# Patient Record
Sex: MALE | Race: WHITE
[De-identification: names, ages, dates, MRNs, and addresses within clinical notes are randomized per-mention and may not be internally consistent; named-entity substitution may affect disease eponyms.]

---

## 2017-06-01 ENCOUNTER — HOSPITAL ENCOUNTER (EMERGENCY)
Dept: HOSPITAL 74 - JER | Age: 22
Discharge: HOME | End: 2017-06-01
Payer: SELF-PAY

## 2017-06-01 VITALS — SYSTOLIC BLOOD PRESSURE: 154 MMHG | DIASTOLIC BLOOD PRESSURE: 86 MMHG | TEMPERATURE: 98.2 F | HEART RATE: 60 BPM

## 2017-06-01 VITALS — BODY MASS INDEX: 31.6 KG/M2

## 2017-06-01 DIAGNOSIS — R07.89: Primary | ICD-10-CM

## 2017-06-01 DIAGNOSIS — R06.4: ICD-10-CM

## 2017-06-01 LAB
ALBUMIN SERPL-MCNC: 4.1 G/DL (ref 3.4–5)
ALP SERPL-CCNC: 71 U/L (ref 45–117)
ALT SERPL-CCNC: 39 U/L (ref 12–78)
ANION GAP SERPL CALC-SCNC: 9 MMOL/L (ref 8–16)
AST SERPL-CCNC: 18 U/L (ref 15–37)
BASOPHILS # BLD: 0.7 % (ref 0–2)
BILIRUB SERPL-MCNC: 0.6 MG/DL (ref 0.2–1)
CALCIUM SERPL-MCNC: 9.1 MG/DL (ref 8.5–10.1)
CO2 SERPL-SCNC: 24 MMOL/L (ref 21–32)
COCKROFT - GAULT: 146.93
CREAT SERPL-MCNC: 1 MG/DL (ref 0.7–1.3)
DEPRECATED RDW RBC AUTO: 12.9 % (ref 11.9–15.9)
EOSINOPHIL # BLD: 0.6 % (ref 0–4.5)
GLUCOSE SERPL-MCNC: 85 MG/DL (ref 74–106)
INR BLD: 1.12 (ref 0.82–1.09)
MCH RBC QN AUTO: 29.5 PG (ref 25.7–33.7)
MCHC RBC AUTO-ENTMCNC: 33.9 G/DL (ref 32–35.9)
MCV RBC: 86.9 FL (ref 80–96)
NEUTROPHILS # BLD: 65.7 % (ref 42.8–82.8)
PLATELET # BLD AUTO: 154 K/MM3 (ref 134–434)
PMV BLD: 10.8 FL (ref 7.5–11.1)
PROT SERPL-MCNC: 7.1 G/DL (ref 6.4–8.2)
PT PNL PPP: 12.3 SEC (ref 9.98–11.88)
TROPONIN I SERPL-MCNC: < 0.02 NG/ML (ref 0–0.05)
WBC # BLD AUTO: 8.2 K/MM3 (ref 4–10)

## 2017-06-01 PROCEDURE — 3E0333Z INTRODUCTION OF ANTI-INFLAMMATORY INTO PERIPHERAL VEIN, PERCUTANEOUS APPROACH: ICD-10-PCS

## 2017-06-01 NOTE — PDOC
History of Present Illness





<Nghia Lewis - Last Filed: 06/01/17 22:42>





- General


History Source: Patient


Exam Limitations: No Limitations





<Esperanza Ramos - Last Filed: 06/01/17 22:46>





- General


Chief Complaint: Chest Pain


Stated Complaint: CHEST PAIN


Time Seen by Provider: 06/01/17 20:21





- History of Present Illness


Initial Comments: 


06/01/17 20:43


Patient is a 21 year old male with no significant past medical history who 

presents to the ED with chest tightness. Patient states that he has been 

experiencing 4 days of intermittent sharp chest tightness, nonradiating, 

nonpleuritic with 5/10 in severity that has worsened today. Patient states that 

the chest tightness is worse at night. He reports 1 episode of vomiting today, 

non bloody. 


He denies any chest pain. 


Allergies: NKA





 (Esperanza Ramos)








Past History





- Immunization History


Immunization Up to Date: Yes





- Psycho/Social/Smoking Cessation Hx


Anxiety: No


Suicidal Ideation: No


Smoking Status: No


Smoking History: Current every day smoker


Number of Cigarettes Smoked Daily: 9


Information on smoking cessation initiated: Yes


Hx Alcohol Use: No


Drug/Substance Use Hx: No


Substance Use Type: None





<Nghia Lewis - Last Filed: 06/01/17 22:42>





<Esperanza Ramos - Last Filed: 06/01/17 22:46>





- Past Medical History


Allergies/Adverse Reactions: 


 Allergies











Allergy/AdvReac Type Severity Reaction Status Date / Time


 


No Known Allergies Allergy   Verified 06/01/17 20:12











Home Medications: 


Ambulatory Orders





NK [No Known Home Medication]  06/01/17 











**Review of Systems





<Nghia Lewis - Last Filed: 06/01/17 22:42>





- Review of Systems


Able to Perform ROS?: Yes





<Esperanza Ramos - Last Filed: 06/01/17 22:46>





- Review of Systems


Comments:: 





06/01/17 20:44


CONSTITUTIONAL: No fever, no chills, no fatigue


EYES: No visual changes


ENT: No ear pain, no sore throat


CARDIOVASCULAR: (+)chest tightness. No chest pain, no palpitations


RESPIRATORY: No cough, no SOB


GI: No abdominal pain, no nausea, no vomiting, no constipation, no diarrhea


GENITOURINARY: No dysuria, no frequency, no hematuria


MUSCULOSKELETAL: No back pain, no joint pain, no myalgias


SKIN: No rash


NEURO: No headache


 (Esperanza Ramos)








*Physical Exam





<Nghia Lewis - Last Filed: 06/01/17 22:42>





<Esperanza Ramos - Last Filed: 06/01/17 22:46>





- Vital Signs


 Last Vital Signs











Temp Pulse Resp BP Pulse Ox


 


    61   21   139/75   100 


 


    06/01/17 21:31  06/01/17 21:31  06/01/17 21:31  06/01/17 21:31

















- Physical Exam


Comments: 


06/01/17 20:44


CONSTITUTIONAL: (+)Anxious appearing, (+)Tachypneic.


HEAD: Normocephalic; atraumatic


EYES: PERRL; EOM intact


ENMT: External appears normal; normal oropharynx


NECK: Supple; non-tender; no cervical lymphadenopathy


CARD: (+)Reproducible left chest wall tenderness. Normal S1, S2; no murmurs, 

rubs, or gallops


RESP: Normal chest excursion with respiration; breath sounds clear and equal 

bilaterally; no wheezes, rhonchi, or rales


ABD: Soft, non-distended; non-tender; no palpable organomegaly, no palpable 

hernias


EXT: Normal ROM in all four extremities; non-tender to palpation; distal pulses 

intact


SKIN: Warm, dry, no rash


NEURO: No focal neurological deficiencies.





 (Esperanza Ramos)








**Heart Score/ECG Review





<Nghia Lewis - Last Filed: 06/01/17 22:42>





<Esperanza Ramos - Last Filed: 06/01/17 22:46>


  ** #1





06/01/17 20:45


NS with sinus arrhythmia


Normal ECG


Vent rate 82 bpm (Esperanza Ramos)








ED Treatment Course





- LABORATORY


CBC & Chemistry Diagram: 


 06/01/17 20:54





 06/01/17 20:54





<Nghia Lewis - Last Filed: 06/01/17 22:42>





- LABORATORY


CBC & Chemistry Diagram: 


 06/01/17 20:54





 06/01/17 20:54





<Esperanza Ramos - Last Filed: 06/01/17 22:46>





- ADDITIONAL ORDERS


Additional order review: 


 Laboratory  Results











  06/01/17 06/01/17 06/01/17





  20:54 20:54 20:54


 


INR    1.12


 


Sodium   142 


 


Potassium   3.6 


 


Chloride   109 H 


 


Carbon Dioxide   24 


 


Anion Gap   9 


 


BUN   17 


 


Creatinine   1.0 


 


Creat Clearance w eGFR   > 60 


 


Random Glucose   85 


 


Calcium   9.1 


 


Total Bilirubin   0.6  D 


 


AST   18  D 


 


ALT   39 


 


Alkaline Phosphatase   71 


 


Creatine Kinase   363 H 


 


Creatine Kinase Index   0.5 


 


CK-MB (CK-2)   1.701 


 


CK-MB (CK-2) Rel Index  Cancelled  


 


Troponin I   < 0.02 


 


Total Protein   7.1 


 


Albumin   4.1 








 











  06/01/17





  20:54


 


RBC  5.29


 


MCV  86.9


 


MCHC  33.9


 


RDW  12.9


 


MPV  10.8


 


Neutrophils %  65.7


 


Lymphocytes %  27.6  D


 


Monocytes %  5.4


 


Eosinophils %  0.6


 


Basophils %  0.7

















- Medications


Given in the ED: 


ED Medications














Discontinued Medications














Generic Name Dose Route Start Last Admin





  Trade Name Freq  PRN Reason Stop Dose Admin


 


Ketorolac Tromethamine  30 mg 06/01/17 20:36 06/01/17 20:53





  Toradol Injection -  IVPUSH 06/01/17 20:37  30 mg





  ONCE ONE   Administration

















Medical Decision Making





<Nghia Lewis - Last Filed: 06/01/17 22:42>





<Esperanza Ramos - Last Filed: 06/01/17 22:46>





- Medical Decision Making





06/01/17 22:42


Patient is a 21-year-old male who presented to the ER with atraumatic 

reproducible left anterior chest wall pain. On initial evaluation, patient was 

noted to be hyperventilating, anxious appearing. Physical exam revealed 

reproducible left anterior chest wall tenderness to palpation. After period of 

reassurance, patient's vital signs normalized without intervention. Chest x-ray 

reveals no evidence of cardiomegaly or pneumothorax. CBC/CMP within normal 

limit. Cardiac enzymes reveal minimally elevated CPK but a normal troponin. I 

do not suspect ACS or PE at this time. Musculoskeletal chest pain is likely. 

Patient has received Toradol and reports significant improvement levels of his 

pain.  Will discharge with medical follow-up as needed. (Nghia Lewis)








*DC/Admit/Observation/Transfer





<Nghia Lewis - Last Filed: 06/01/17 22:42>





<Esperanza Ramos - Last Filed: 06/01/17 22:46>


Diagnosis at time of Disposition: 


 Atypical chest pain





- Discharge Dispostion


Disposition: HOME





- Referrals


Referrals: 


[facility] [Provider Group]





- Patient Instructions


Printed Discharge Instructions:  DI for Atypical Chest Pain





- Attestations


Physician Attestion: 





06/01/17 22:42


The documentation was prepared by the scribe under my direct supervision. I 

have reviewed the documentation which correctly represents the findings, 

medical decision-making and critical action taken by me. (Nghia Lewis)

## 2018-04-23 ENCOUNTER — HOSPITAL ENCOUNTER (EMERGENCY)
Dept: HOSPITAL 74 - JERFT | Age: 23
Discharge: HOME | End: 2018-04-23
Payer: COMMERCIAL

## 2018-04-23 VITALS — DIASTOLIC BLOOD PRESSURE: 66 MMHG | TEMPERATURE: 98.1 F | SYSTOLIC BLOOD PRESSURE: 131 MMHG | HEART RATE: 88 BPM

## 2018-04-23 VITALS — BODY MASS INDEX: 31.9 KG/M2
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
## 2018-04-23 DIAGNOSIS — S63.8X1A: Primary | ICD-10-CM
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
## 2018-04-23 NOTE — PDOC
History of Present Illness





- General


Chief Complaint: Injury


Stated Complaint: SWOLLEN RT HAND


Time Seen by Provider: 04/23/18 13:43


History Source: Patient





- History of Present Illness


Occurred: reports: yesterday, other


Upper Extremity Pain Location: right: hand





Past History





- Past Medical History


Allergies/Adverse Reactions: 


 Allergies











Allergy/AdvReac Type Severity Reaction Status Date / Time


 


No Known Allergies Allergy   Verified 04/23/18 13:39











Home Medications: 


Ambulatory Orders





NK [No Known Home Medication]  06/01/17 








COPD: No





- Immunization History


Immunization Up to Date: Yes





- Suicide/Smoking/Psychosocial Hx


Smoking Status: No


Smoking History: Current every day smoker


Have you smoked in the past 12 months: No


Number of Cigarettes Smoked Daily: 9


Information on smoking cessation initiated: Yes


Hx Alcohol Use: No


Drug/Substance Use Hx: No


Substance Use Type: None





**Review of Systems





- Review of Systems


Musculoskeletal: Yes: Joint Pain, Joint Swelling





*Physical Exam





- Vital Signs


 Last Vital Signs











Temp Pulse Resp BP Pulse Ox


 


 98.1 F   88   18   131/66   100 


 


 04/23/18 13:39  04/23/18 13:39  04/23/18 13:39  04/23/18 13:39  04/23/18 13:39














- Physical Exam


HEENT: positive: Normal Voice


Extremity: positive: Swelling (significant swelling w/ some ecchymosis to 

thenar eminnence of R hand, FROMi to R thumb, no snuffbox ttp)





ED Treatment Course





- RADIOLOGY


Radiology Studies Ordered: 














 Category Date Time Status


 


 FINGER(S) RIGHT [RAD] Stat Radiology  04/23/18 13:51 Ordered


 


 HAND- RIGHT [RAD] Stat Radiology  04/23/18 13:51 Ordered














- Medications


Given in the ED: 


ED Medications














Discontinued Medications














Generic Name Dose Route Start Last Admin





  Trade Name Freq  PRN Reason Stop Dose Admin


 


Ibuprofen  800 mg  04/23/18 13:52  04/23/18 13:57





  Motrin -  PO  04/23/18 13:53  800 mg





  ONCE ONE   Administration














Medical Decision Making





- Medical Decision Making





04/23/18 14:05


22-year-old male, no significant history here with right hand pain, swelling.  

Patient states during a game of baseball yesterday he felt immediate pain and 

noticed swelling right after swinging the bat.  States symptoms worsened this 

a.m.  Denies any fall





See exam





Possible R hand sprain, r/o fx


-xr


-pain meds





04/23/18 14:53


Xray neg for fracture. ACE placed to R hand. To take motrin prn pain. Hand f/u 

as needed in 2 weeks











*DC/Admit/Observation/Transfer


Diagnosis at time of Disposition: 


Hand sprain


Qualifiers:


 Encounter type: initial encounter Laterality: right Qualified Code(s): 

S63.91XA - Sprain of unspecified part of right wrist and hand, initial encounter








- Discharge Dispostion


Disposition: HOME


Condition at time of disposition: Improved





- Referrals


Referrals: 


Nixon Garcia MD [Staff Physician] - 





- Patient Instructions


Additional Instructions: 


Your xray was negative for any fracture.  You most likely sustain a hand sprain 

which can take 1-2 weeks to heal.  Keep Ace bandage in place for swelling.  You 

can also ice area and elevate at home to reduce swelling.  Take 800 of Motrin 

every 6 hour period.


If symptoms persist after 2 weeks.  Follow-up with Dr. Garcia of orthopedics





- Post Discharge Activity


Forms/Work/School Notes:  Back to School

## 2018-11-27 ENCOUNTER — HOSPITAL ENCOUNTER (EMERGENCY)
Dept: HOSPITAL 74 - JER | Age: 23
LOS: 1 days | Discharge: HOME | End: 2018-11-28
Payer: COMMERCIAL

## 2018-11-27 VITALS — TEMPERATURE: 98.3 F

## 2018-11-27 VITALS — BODY MASS INDEX: 34.9 KG/M2

## 2018-11-27 DIAGNOSIS — K29.70: Primary | ICD-10-CM

## 2018-11-27 LAB
ALBUMIN SERPL-MCNC: 4.1 G/DL (ref 3.4–5)
ALP SERPL-CCNC: 73 U/L (ref 45–117)
ALT SERPL-CCNC: 41 U/L (ref 13–61)
ANION GAP SERPL CALC-SCNC: 7 MMOL/L (ref 8–16)
AST SERPL-CCNC: 22 U/L (ref 15–37)
BASOPHILS # BLD: 0.3 % (ref 0–2)
BILIRUB SERPL-MCNC: 0.6 MG/DL (ref 0.2–1)
BUN SERPL-MCNC: 20 MG/DL (ref 7–18)
CALCIUM SERPL-MCNC: 8.8 MG/DL (ref 8.5–10.1)
CHLORIDE SERPL-SCNC: 103 MMOL/L (ref 98–107)
CO2 SERPL-SCNC: 25 MMOL/L (ref 21–32)
CREAT SERPL-MCNC: 1.1 MG/DL (ref 0.55–1.3)
DEPRECATED RDW RBC AUTO: 12.6 % (ref 11.9–15.9)
EOSINOPHIL # BLD: 0.5 % (ref 0–4.5)
GLUCOSE SERPL-MCNC: 99 MG/DL (ref 74–106)
HCT VFR BLD CALC: 43.8 % (ref 35.4–49)
HGB BLD-MCNC: 15.7 GM/DL (ref 11.7–16.9)
INR BLD: 1 (ref 0.83–1.09)
LYMPHOCYTES # BLD: 12.7 % (ref 8–40)
MAGNESIUM SERPL-MCNC: 2 MG/DL (ref 1.8–2.4)
MCH RBC QN AUTO: 30.5 PG (ref 25.7–33.7)
MCHC RBC AUTO-ENTMCNC: 35.8 G/DL (ref 32–35.9)
MCV RBC: 85.1 FL (ref 80–96)
MONOCYTES # BLD AUTO: 2.8 % (ref 3.8–10.2)
NEUTROPHILS # BLD: 83.7 % (ref 42.8–82.8)
PLATELET # BLD AUTO: 186 K/MM3 (ref 134–434)
PMV BLD: 10.4 FL (ref 7.5–11.1)
POTASSIUM SERPLBLD-SCNC: 4.6 MMOL/L (ref 3.5–5.1)
PROT SERPL-MCNC: 7.6 G/DL (ref 6.4–8.2)
PT PNL PPP: 11.8 SEC (ref 9.7–13)
RBC # BLD AUTO: 5.15 M/MM3 (ref 4–5.6)
SODIUM SERPL-SCNC: 136 MMOL/L (ref 136–145)
WBC # BLD AUTO: 11.9 K/MM3 (ref 4–10)

## 2018-11-27 PROCEDURE — 3E033GC INTRODUCTION OF OTHER THERAPEUTIC SUBSTANCE INTO PERIPHERAL VEIN, PERCUTANEOUS APPROACH: ICD-10-PCS

## 2018-11-27 NOTE — PDOC
History of Present Illness





- General


Chief Complaint: Chest Pain


Stated Complaint: ABNORMAL EKG


Time Seen by Provider: 11/27/18 21:00


History Source: Patient





- History of Present Illness


Initial Comments: 





11/27/18 23:19


23 year old male with epigastric pain raditing up to right chest pain for the 

last 6 days. reports that yesterday he was woken up out of sleep with chest 

pain. reports that pain is sometimes burning in nature. patient was sent here 

by [facility] for evaluation of chest pain. patient reports that chest pain 

is worse with deep breath. denies NVD, fever/ chills, urinary symptoms or flank 

pain. 





Past History





- Past Medical History


Allergies/Adverse Reactions: 


 Allergies











Allergy/AdvReac Type Severity Reaction Status Date / Time


 


No Known Allergies Allergy   Verified 11/27/18 20:54











Home Medications: 


Ambulatory Orders





Famotidine [Pepcid -] 20 mg PO BID #14 tablet 11/28/18 








COPD: No





- Immunization History


Immunization Up to Date: Yes





- Suicide/Smoking/Psychosocial Hx


Smoking Status: No


Smoking History: Never smoked


Have you smoked in the past 12 months: No


Number of Cigarettes Smoked Daily: 9


Information on smoking cessation initiated: No


Hx Alcohol Use: No


Drug/Substance Use Hx: No


Substance Use Type: None





*Physical Exam





- Vital Signs


 Last Vital Signs











Temp Pulse Resp BP Pulse Ox


 


 98.3 F   75   16   137/65   100 


 


 11/27/18 20:53  11/27/18 20:53  11/27/18 20:53  11/27/18 20:53  11/27/18 20:53














- Physical Exam


General Appearance: Yes: Appropriately Dressed


Respiratory/Chest: positive: Lungs Clear, Normal Breath Sounds.  negative: 

Chest Tender


Cardiovascular: positive: Regular Rhythm, Regular Rate


Gastrointestinal/Abdominal: positive: Normal Bowel Sounds, Soft.  negative: 

Tender (no epigastric RUQ tenderness)


Musculoskeletal: positive: Normal Inspection


Extremity: positive: Normal Capillary Refill, Normal Inspection, Normal Range 

of Motion


Integumentary: positive: Normal Color, Dry, Warm


Neurologic: positive: Fully Oriented, Alert





Moderate Sedation





- Procedure Monitoring


Vital Signs: 


Procedure Monitoring Vital Signs











Temperature  98.3 F   11/27/18 20:53


 


Pulse Rate  75   11/27/18 20:53


 


Respiratory Rate  16   11/27/18 20:53


 


Blood Pressure  137/65   11/27/18 20:53


 


O2 Sat by Pulse Oximetry (%)  100   11/27/18 20:53











ED Treatment Course





- LABORATORY


CBC & Chemistry Diagram: 


 11/27/18 22:41





 11/27/18 22:41





- ADDITIONAL ORDERS


Additional order review: 


 Laboratory  Results











  11/27/18 11/27/18 11/27/18





  22:41 22:41 22:41


 


PT with INR    11.80


 


INR    1.00


 


D-Dimer   < 215 


 


Sodium  136  


 


Potassium  4.6  


 


Chloride  103  


 


Carbon Dioxide  25  


 


Anion Gap  7 L  


 


BUN  20 H  


 


Creatinine  1.1  


 


Creat Clearance w eGFR  > 60  


 


Random Glucose  99  


 


Calcium  8.8  


 


Magnesium  2.0  


 


Total Bilirubin  0.6  


 


AST  22  


 


ALT  41  


 


Alkaline Phosphatase  73  


 


Creatine Kinase  149  


 


Troponin I  < 0.02  


 


Total Protein  7.6  


 


Albumin  4.1  








 











  11/27/18





  22:41


 


RBC  5.15


 


MCV  85.1


 


MCHC  35.8


 


RDW  12.6


 


MPV  10.4


 


Neutrophils %  83.7 H D


 


Lymphocytes %  12.7  D


 


Monocytes %  2.8 L


 


Eosinophils %  0.5


 


Basophils %  0.3














- RADIOLOGY


Radiology Studies Ordered: 














 Category Date Time Status


 


 CHEST PA & LAT [RAD] Stat Radiology  11/27/18 22:19 Taken














- Medications


Given in the ED: 


ED Medications














Discontinued Medications














Generic Name Dose Route Start Last Admin





  Trade Name Freq  PRN Reason Stop Dose Admin


 


Al Hydroxide/Mg Hydroxide  30 ml  11/27/18 22:36  11/27/18 22:52





  Mylanta Oral Suspension -  PO  11/27/18 22:37  30 ml





  ONCE ONE   Administration





     





     





     





     


 


Famotidine/Sodium Chloride  20 mg in 50 mls @ 100 mls/hr  11/27/18 22:36  11/27/ 18 22:52





  Pepcid 20 Mg Premixed Ivpb -  IVPB  11/27/18 23:05  100 mls/hr





  ONCE ONE   Administration





     





     





     





     














Medical Decision Making





- Medical Decision Making





11/27/18 23:23


A: chest pain/ gastritis





*DC/Admit/Observation/Transfer


Diagnosis at time of Disposition: 


Chest pain


Qualifiers:


 Chest pain type: unspecified Qualified Code(s): R07.9 - Chest pain, unspecified





Gastritis


Qualifiers:


 Gastritis type: unspecified gastritis Chronicity: unspecified Gastritis 

bleeding: without bleeding Qualified Code(s): K29.70 - Gastritis, unspecified, 

without bleeding








- Discharge Dispostion


Disposition: HOME





- Prescriptions


Prescriptions: 


Famotidine [Pepcid -] 20 mg PO BID #14 tablet





- Referrals





- Patient Instructions


Printed Discharge Instructions:  [facility] for Chest Pain, [place] [facility]


Additional Instructions: 


start a bland diet. 





take pepcid as prescribed





follow up with your doctor as soon as possible. 








return to the ER if symptoms worsen. 





- Post Discharge Activity

## 2018-11-27 NOTE — PDOC
Rapid Medical Evaluation


Chief Complaint: Chest Pain


Time Seen by Provider: 11/27/18 21:00


Medical Evaluation: 


 Allergies











Allergy/AdvReac Type Severity Reaction Status Date / Time


 


No Known Allergies Allergy   Verified 11/27/18 20:54








Vital Signs











Temp Pulse Resp BP Pulse Ox


 


 98.3 F   75   16   137/65   100 


 


 11/27/18 20:53  11/27/18 20:53  11/27/18 20:53  11/27/18 20:53  11/27/18 20:53








11/27/18 21:01


Patient c/o: went to urgent care for chest abd pain, had normal labs , ekg with 

lateral st elevation, smokes marijuana


Patient on brief exam: no reproducible chest pain, ekg no st elevation in lat 

leads rate 76


Patient ordered for: ekg


The patient will proceed to the ED





**Discharge Disposition





- Diagnosis


 Chest pain








- Referrals





- Patient Instructions





- Post Discharge Activity

## 2018-11-28 ENCOUNTER — HOSPITAL ENCOUNTER (EMERGENCY)
Dept: HOSPITAL 74 - JER | Age: 23
LOS: 1 days | Discharge: HOME | End: 2018-11-29
Payer: COMMERCIAL

## 2018-11-28 VITALS — HEART RATE: 88 BPM | TEMPERATURE: 98.6 F | DIASTOLIC BLOOD PRESSURE: 93 MMHG | SYSTOLIC BLOOD PRESSURE: 180 MMHG

## 2018-11-28 VITALS — DIASTOLIC BLOOD PRESSURE: 74 MMHG | SYSTOLIC BLOOD PRESSURE: 124 MMHG | HEART RATE: 74 BPM

## 2018-11-28 VITALS — BODY MASS INDEX: 34.2 KG/M2

## 2018-11-28 DIAGNOSIS — R07.9: Primary | ICD-10-CM

## 2018-11-28 LAB
ALBUMIN SERPL-MCNC: 4.7 G/DL (ref 3.4–5)
ALP SERPL-CCNC: 76 U/L (ref 45–117)
ALT SERPL-CCNC: 42 U/L (ref 13–61)
ANION GAP SERPL CALC-SCNC: 12 MMOL/L (ref 8–16)
APPEARANCE UR: (no result)
AST SERPL-CCNC: 18 U/L (ref 15–37)
BASOPHILS # BLD: 0.4 % (ref 0–2)
BILIRUB SERPL-MCNC: 1 MG/DL (ref 0.2–1)
BILIRUB UR STRIP.AUTO-MCNC: NEGATIVE MG/DL
BUN SERPL-MCNC: 14 MG/DL (ref 7–18)
CALCIUM SERPL-MCNC: 9.5 MG/DL (ref 8.5–10.1)
CHLORIDE SERPL-SCNC: 102 MMOL/L (ref 98–107)
CO2 SERPL-SCNC: 23 MMOL/L (ref 21–32)
COLOR UR: (no result)
CREAT SERPL-MCNC: 1 MG/DL (ref 0.55–1.3)
DEPRECATED RDW RBC AUTO: 12.5 % (ref 11.9–15.9)
EOSINOPHIL # BLD: 0.2 % (ref 0–4.5)
GLUCOSE SERPL-MCNC: 81 MG/DL (ref 74–106)
HCT VFR BLD CALC: 46.7 % (ref 35.4–49)
HGB BLD-MCNC: 16.7 GM/DL (ref 11.7–16.9)
KETONES UR QL STRIP: NEGATIVE
LEUKOCYTE ESTERASE UR QL STRIP.AUTO: NEGATIVE
LYMPHOCYTES # BLD: 17 % (ref 8–40)
MAGNESIUM SERPL-MCNC: 2.2 MG/DL (ref 1.8–2.4)
MCH RBC QN AUTO: 30.5 PG (ref 25.7–33.7)
MCHC RBC AUTO-ENTMCNC: 35.8 G/DL (ref 32–35.9)
MCV RBC: 85.1 FL (ref 80–96)
MONOCYTES # BLD AUTO: 4.5 % (ref 3.8–10.2)
MUCOUS THREADS URNS QL MICRO: (no result)
NEUTROPHILS # BLD: 77.9 % (ref 42.8–82.8)
NITRITE UR QL STRIP: NEGATIVE
PH UR: 5 [PH] (ref 5–8)
PLATELET # BLD AUTO: 182 K/MM3 (ref 134–434)
PMV BLD: 10.1 FL (ref 7.5–11.1)
POTASSIUM SERPLBLD-SCNC: 4.1 MMOL/L (ref 3.5–5.1)
PROT SERPL-MCNC: 8.2 G/DL (ref 6.4–8.2)
PROT UR QL STRIP: (no result)
PROT UR QL STRIP: (no result)
RBC # BLD AUTO: 5.48 M/MM3 (ref 4–5.6)
SODIUM SERPL-SCNC: 137 MMOL/L (ref 136–145)
SP GR UR: 1.02 (ref 1.01–1.03)
UROBILINOGEN UR STRIP-MCNC: NEGATIVE MG/DL (ref 0.2–1)
WBC # BLD AUTO: 8.9 K/MM3 (ref 4–10)

## 2018-11-28 NOTE — PDOC
History of Present Illness





- General


Chief Complaint: Palpitations


Stated Complaint: CHEST PAIN


Time Seen by Provider: 11/28/18 19:05





- History of Present Illness


Initial Comments: 





11/28/18 20:32


24 yo M with no significant pmh who p/w epigastirc pain. Patient reports onset 

of stable, pressure-type, epigastria and right sided chest pain beginning 11/22/ 18, with no identifiable triggers. Pain unremitting, and worse with supine 

position. Denies food triggers. Pain slightly improved with Ranitidine PO. 

Normal bowel habits. No change in appetite. Patient also with diaphoresis, and 

nervousness, which he attributes to Perocet cessation x 1 week. Patient with 

chronic opioid dependence. Percocet x 5+ years for dental carry related pain. 

Patient to be seen by sponsor next week. 





Patient denies N/V, PND, palpitations, F,C, CP, SOB, urinary complaints, BPR, 

hematuria, diarrhea, constipation, lightheadedness, weakness, sensory changes. 





PMHx: as noted above. Denies h/o ACS/MI, endoscopy, or GI f/u. Denies h/o 

abdominal surgery.  


ROS: as noted


SHx: tobacco use 1/4 ppd x 5 years. Social Etoh. Denies IVDA. 


Allergies:NKDA











Past History





- Past Medical History


Allergies/Adverse Reactions: 


 Allergies











Allergy/AdvReac Type Severity Reaction Status Date / Time


 


No Known Allergies Allergy   Verified 11/28/18 19:08











Home Medications: 


Ambulatory Orders





Famotidine [Pepcid -] 20 mg PO BID #14 tablet 11/28/18 


Pantoprazole Sodium [Protonix -] 40 mg PO DAILY #30 tablet.ec 11/28/18 








COPD: No





- Immunization History


Immunization Up to Date: Yes





- Suicide/Smoking/Psychosocial Hx


Smoking Status: No


Smoking History: Never smoked


Have you smoked in the past 12 months: No


Number of Cigarettes Smoked Daily: 9


Hx Alcohol Use: No


Drug/Substance Use Hx: No


Substance Use Type: None





**Review of Systems





- Review of Systems


Comments:: 





11/28/18 20:33


GENERAL/CONSTITUTIONAL: No fever or chills. No weakness.


HEAD, EYES, EARS, NOSE AND THROAT: No change in vision. No ear pain or 

discharge. No sore throat.


CARDIOVASCULAR: No chest pain or shortness of breath


RESPIRATORY: No cough, wheezing, or hemoptysis.


GASTROINTESTINAL: + Epigastric abdominal pain. No nausea, vomiting, diarrhea or 

constipation.


GENITOURINARY: No dysuria, frequency, or change in urination.


MUSCULOSKELETAL: No joint or muscle swelling or pain. No neck or back pain.


SKIN: No rash


NEUROLOGIC: No headache, vertigo, loss of consciousness, or change in strength/

sensation.


ENDOCRINE: No increased thirst. No abnormal weight change


HEMATOLOGIC/LYMPHATIC: No anemia, easy bleeding, or history of blood clots.


ALLERGIC/IMMUNOLOGIC: No hives or skin allergy.











*Physical Exam





- Vital Signs


 Last Vital Signs











Temp Pulse Resp BP Pulse Ox


 


 98.6 F   88   18   180/93 H  99 


 


 11/28/18 19:06  11/28/18 19:06  11/28/18 19:06  11/28/18 19:06  11/28/18 19:06














- Physical Exam


Comments: 





11/28/18 20:33


GENERAL: Awake, alert, and fully oriented, in no acute distress


HEAD: No signs of trauma, normocephalic, atraumatic 


EYES: PERRLA, EOMI, sclera anicteric, conjunctiva clear


ENT: Hearing grossly normal, nares patent, oropharynx clear without


exudates. Moist mucosa


NECK: Normal ROM, supple, no lymphadenopathy, JVD, or masses


LUNGS: No distress, speaks full sentences, clear to auscultation bilaterally 


HEART: Regular rate and rhythm, normal S1 and S2, no murmurs, rubs or gallops, 

peripheral pulses normal and equal bilaterally. 


ABDOMEN: Soft, nontender, normoactive bowel sounds.  No guarding, no rebound.  

No masses. Neg CVA ttp. 


EXTREMITIES : Normal inspection, Normal range of motion, no edema.  No clubbing 

or cyanosis. 


SKIN: Warm, Dry, normal turgor, no rashes or lesions noted











Moderate Sedation





- Procedure Monitoring


Vital Signs: 


Procedure Monitoring Vital Signs











Temperature  98.6 F   11/28/18 19:06


 


Pulse Rate  88   11/28/18 19:06


 


Respiratory Rate  18   11/28/18 19:06


 


Blood Pressure  180/93 H  11/28/18 19:06


 


O2 Sat by Pulse Oximetry (%)  99   11/28/18 19:06











ED Treatment Course





- LABORATORY


CBC & Chemistry Diagram: 


 11/28/18 20:13





 11/28/18 20:13





- ADDITIONAL ORDERS


Additional order review: 


 Laboratory  Results











  11/28/18 11/28/18





  21:42 20:13


 


Sodium   137


 


Potassium   4.1


 


Chloride   102


 


Carbon Dioxide   23


 


Anion Gap   12


 


BUN   14


 


Creatinine   1.0


 


Creat Clearance w eGFR   > 60


 


Random Glucose   81


 


Calcium   9.5


 


Magnesium   2.2


 


Total Bilirubin   1.0


 


AST   18


 


ALT   42


 


Alkaline Phosphatase   76


 


Creatine Kinase   151


 


Creatine Kinase Index   1.1


 


CK-MB (CK-2)   1.7


 


Troponin I   < 0.02


 


Total Protein   8.2


 


Albumin   4.7


 


Urine Color  Dkyellow 


 


Urine Appearance  Slcloudy 


 


Urine pH  5.0 


 


Ur Specific Gravity  1.017 


 


Urine Protein  2+ H 


 


Urine Glucose (UA)  1+ H 


 


Urine Ketones  Negative 


 


Urine Blood  Negative 


 


Urine Nitrite  Negative 


 


Urine Bilirubin  Negative 


 


Urine Urobilinogen  Negative 


 


Ur Leukocyte Esterase  Negative 


 


Urine WBC (Auto)  120 


 


Urine RBC (Auto)  110 


 


Urine Mucus  Rare 








 











  11/28/18





  20:13


 


RBC  5.48


 


MCV  85.1


 


MCHC  35.8


 


RDW  12.5


 


MPV  10.1


 


Neutrophils %  77.9


 


Lymphocytes %  17.0  D


 


Monocytes %  4.5


 


Eosinophils %  0.2


 


Basophils %  0.4














- Medications


Given in the ED: 


ED Medications














Discontinued Medications














Generic Name Dose Route Start Last Admin





  Trade Name Freq  PRN Reason Stop Dose Admin


 


Al Hydroxide/Mg Hydroxide  30 ml  11/28/18 20:56  11/28/18 21:20





  Mylanta Oral Suspension -  PO  11/28/18 20:57  30 ml





  ONCE ONE   Administration





     





     





     





     


 


Famotidine/Sodium Chloride  20 mg in 50 mls @ 100 mls/hr  11/28/18 20:56  11/28/ 18 21:10





  Pepcid 20 Mg Premixed Ivpb -  IVPB  11/28/18 21:25  100 mls/hr





  ONCE ONE   Administration





     





     





     





     


 


Sucralfate  1 gm  11/28/18 20:56  11/28/18 21:30





  Carafate -  PO  11/28/18 20:57  1 gm





  ONCE ONE   Administration





     





     





     





     














Medical Decision Making





- Medical Decision Making





11/28/18 21:01


24 yo M with no significant pmh who p/w epigastirc pain. /93, vitals 

otherwise wnl, AF, physical exam unremarkable. ACS/MI r/o. Patient with 

dyspepsia like symptoms. Likely esophagitis vs. gastritis. Will consider 

biliary disease, gastroenteritis, anixety related disorder. Low suspicion AAA, 

ao dissection, ACS, colitis, appendicitis. Pain control and reassess. 





Ed Course: 


RME labs 


CBC,CMP, Cardiac Pr. 


Maloox, Carafate, Famotidine





11/28/18 22:15


UA: 120 WBC, 110 RBC, 2 + Protein


11/28/18 22:16


CBC, CMP, trop: Unremarkable





Patient denies urinary complaints. 


EKG: NSR with absent SIA, STD. Normal axis and interval duration 


symptoms controlled


Patient stable for d/c with return precautions. Advised to f/u with GI and 

neprhology. 








*DC/Admit/Observation/Transfer


Diagnosis at time of Disposition: 


 Atypical chest pain








- Discharge Dispostion


Disposition: HOME


Condition at time of disposition: Stable


Decision to Admit order: No





- Prescriptions


Prescriptions: 


Pantoprazole Sodium [Protonix -] 40 mg PO DAILY #30 tablet.ec





- Referrals


Referrals: 


Kush Crooks MD [Staff Physician] - 


iDvya Rucker MD [Staff Physician] - 





- Patient Instructions


Printed Discharge Instructions:  DI for Epigastric Pain


Additional Instructions: 


Please return to the emergency department with any new or worsening symptoms or 

concerns. Please follow up with your primary care physician within 72 hours. 

Please follow up with nephrology within 72 hours for protein in urine, and WBC 

110, . Follow up with Gastroenterology within one week. 








- Post Discharge Activity





- Attestations


Physician Attestion: 





11/28/18 20:34

## 2018-11-28 NOTE — PDOC
Attending Attestation





- HPI


HPI: 





11/28/18 22:29


Patient is a 23 year old male with a significant past medical history of Opioid 

dependence who presents to the ED with complaints of epigastric abdominal pain 

that began x1 week ago. Patient reports experiencing chronic epigastric 

abdominal pain that is a pressured pain that has begun to radiate to his right 

chest. He reports going to urgent care yesterday afternoon as well as the ED 

for the same symptoms. Patient reports coming into the ED for further 

evaluation after experiencing intermittent episodes of diaphoresis that began 

to worry him. 





Denies chest pain, Sob. Denies nausea, vomiting. Denies fevers, chills. Denies 

contact with sick individuals, out of state travelling.. Denies dysuria, 

hematuria. Denies diarrhea, constipation. Denies trauma to affected area. 

Denies any other symptoms. 





Allergies: None


Social history: No smoking. No alcohol. Percocet use. 


Surgical history: None


PMD: None








<Gordo Gurrola - Last Filed: 11/28/18 22:29>





- Resident


Resident Name: Ethan Giraldo





- ED Attending Attestation


I have performed the following: I have examined & evaluated the patient, The 

case was reviewed & discussed with the resident, I agree w/resident's findings 

& plan, Exceptions are as noted





- Physicial Exam


PE: 





11/29/18 03:40


agree with exam as documented by resident





- Medical Decision Making





11/29/18 03:40


atypical chest pain, consider gastritis


analgesia


re-eval





symptoms improved with meds





UA with sterile pyuria, proteinuria, asymptomatic


f/u with nephrology





<Ervin Yeung - Last Filed: 11/29/18 03:42>

## 2018-11-28 NOTE — PDOC
Rapid Medical Evaluation


Time Seen by Provider: 11/28/18 19:05


Medical Evaluation: 


 Allergies











Allergy/AdvReac Type Severity Reaction Status Date / Time


 


No Known Allergies Allergy   Verified 11/27/18 20:54











11/28/18 19:06


I have performed a brief in-person evaluation of this patient.





The patient presents with a chief complaint of: chest pressure s/p percocet 

detox x3 days





Pertinent physical exam findings: Lungs CTAB. RRR. S1s2. No m/r/g.





I have ordered the following: EKG, labs





The patient will proceed to the ED for further evaluation.








**Discharge Disposition





- Diagnosis


 Atypical chest pain








- Referrals





- Patient Instructions





- Post Discharge Activity

## 2018-11-28 NOTE — EKG
Test Reason : 

Blood Pressure : ***/*** mmHG

Vent. Rate : 076 BPM     Atrial Rate : 076 BPM

   P-R Int : 132 ms          QRS Dur : 094 ms

    QT Int : 366 ms       P-R-T Axes : 067 085 028 degrees

   QTc Int : 411 ms

 

NORMAL SINUS RHYTHM WITH SINUS ARRHYTHMIA

NORMAL ECG

WHEN COMPARED WITH ECG OF 01-JUN-2017 20:19,

NO SIGNIFICANT CHANGE WAS FOUND

Confirmed by NITA SORIANO, MIRI ([id number]) on 11/28/2018 11:45:32 AM

 

Referred By:  GALINA           Confirmed By:MIRI MOYA MD

## 2018-11-29 NOTE — EKG
Test Reason : 

Blood Pressure : ***/*** mmHG

Vent. Rate : 071 BPM     Atrial Rate : 071 BPM

   P-R Int : 132 ms          QRS Dur : 092 ms

    QT Int : 382 ms       P-R-T Axes : 063 080 028 degrees

   QTc Int : 415 ms

 

NORMAL SINUS RHYTHM WITH SINUS ARRHYTHMIA

NORMAL ECG

WHEN COMPARED WITH ECG OF 27-NOV-2018 20:59,

NO SIGNIFICANT CHANGE WAS FOUND

Confirmed by LINDA CANALES MD ([id number]) on 11/29/2018 11:58:10 AM

 

Referred By:             Confirmed By:LINDA CANALES MD

## 2018-11-30 ENCOUNTER — HOSPITAL ENCOUNTER (EMERGENCY)
Dept: HOSPITAL 74 - JER | Age: 23
Discharge: HOME | End: 2018-11-30
Payer: COMMERCIAL

## 2018-11-30 VITALS — TEMPERATURE: 98.6 F | DIASTOLIC BLOOD PRESSURE: 97 MMHG | SYSTOLIC BLOOD PRESSURE: 134 MMHG | HEART RATE: 86 BPM

## 2018-11-30 VITALS — BODY MASS INDEX: 33.2 KG/M2

## 2018-11-30 DIAGNOSIS — F11.23: ICD-10-CM

## 2018-11-30 DIAGNOSIS — R07.9: Primary | ICD-10-CM

## 2018-11-30 DIAGNOSIS — F17.210: ICD-10-CM

## 2018-11-30 NOTE — PDOC
Attending Attestation





- Resident


Resident Name: KristalMontygreggamadeo





- ED Attending Attestation


I have performed the following: I have examined & evaluated the patient, The 

case was reviewed & discussed with the resident, I agree w/resident's findings 

& plan





- [facility]


HPI: 





11/30/18 14:12





Mr. Ricardo Ny is a 23 year old male with no significant past medical 

history presents to the emergency department with right sided chest tightness. 

no radiation of pain, nonexertional. a/w anxiety and palpitations today, after 

he took a smoke and occ uses marijuana. Denies taking any meds to alleviate the 

pain. Per prior charts, the patient has a chronic hx of Percocet x5 years for 

dental  pain. has been off percocet x 4-5 days, feeling w/d sx.





The patient was seen at the ED on 11/28/2018 for epigastric and R. sided chest 

pain, with neg trops and CXR. The patient had unremarkable trop, and discharged 

home earlier this week. The patient was prescribed Protonix. also awaiting oral 

abx for his dental pain.


Allergies: NKA


PCP: None reported 











- Physicial Exam


PE: 





11/30/18 14:13





NAD, well appearing, PERRL, EOMI, MMM, nl conjunctiva, anicteric; neck supple. 

lungs clear, RRR, abdomen soft nontender. CHARLES x4, no focal neuro deficits. No 

peripheral edema. normal color for ethnicity, WWP.














- Medical Decision Making





11/30/18 14:13








See HPI for details


Vital signs reviewed, +initial tachy, but anxious


Prior notes reviewed, including admissions, discharges and consultations. 

laboratory results and imaging reviewed, previously with neg trops, normal CXR 

and unchanged EKG


EKG normal sinus rhythm, no interval abnormalities, narrow QRS, ST and T wave 

segments and morphology normal. Nonspecific T wave abnormalities 


nonischemic. 





ED course: no acute events, sx resolved and well appearing. VS improved, no 

longer tachy. 


likely opioid w/d sx, doubt emergent pathology. 


trial clonidine TID PRN for opioid w/d symptoms and craving, offered detox 

information.





Dispo: I discussed the physical exam findings, ancillary test results and final 

diagnoses with the patient. I answered all of the patient's questions. The 

patient was satisfied with the care received and felt comfortable with the 

discharge plan and treatment plan. The patient will return to the Emergency 

Department with any new, persistent or worsening symptoms.








11/30/18 14:17








**Heart Score/ECG Review





- ECG Impressions


Normal ECG: Yes


Comment:: 





11/30/18 14:15





EKG normal sinus rhythm, no interval abnormalities, narrow QRS, ST and T wave 

segments and morphology normal. Nonspecific T wave abnormalities 


nonischemic.

## 2018-11-30 NOTE — PDOC
History of Present Illness





- History of Present Illness


Initial Comments: 


 23 year old male with PMH of percocet abuse presenting with right sided chest 

tightness that acutely worsened today. States that he has been a little anxious 

and has had a poor appetite over the past few days while detoxing form the 

percocets and going through subsequent withdrawal symptoms. A few hours prior 

he was feeling quite anxious and agitated then noticed a suffer onset right 

sided , non-radiating,, non-pleuritic chest tightness. He has never had this 

issue before. He is severely anxious about this pain being something serious. 

He denies history of HTN, diabetes, but does have a father who had an MI in his 

mid 50s but the patient admits that he was heavy drinker and smoker. Patient 

would like to be reassured that he does not have any serious cardiac or other 

pathology right now.


11/30/18 12:57








<Marino Giraldo - Last Filed: 11/30/18 14:05>





<Ingrid Da Silva - Last Filed: 11/30/18 14:16>





- General


Chief Complaint: Pain, Acute


Stated Complaint: CHEST PAIN, SOB


Time Seen by Provider: 11/30/18 12:30





Past History





- Past Medical History


COPD: No





- Immunization History


Immunization Up to Date: Yes





- Suicide/Smoking/Psychosocial Hx


Smoking Status: No


Smoking History: Current every day smoker


Have you smoked in the past 12 months: No


Number of Cigarettes Smoked Daily: 9


Information on smoking cessation initiated: No


Hx Alcohol Use: No


Drug/Substance Use Hx: Yes (percocets  3-4 per day long term)


Substance Use Type: None





<Marino Giraldo - Last Filed: 11/30/18 14:05>





<Ingrid Da Silva - Last Filed: 11/30/18 14:16>





- Past Medical History


Allergies/Adverse Reactions: 


 Allergies











Allergy/AdvReac Type Severity Reaction Status Date / Time


 


No Known Allergies Allergy   Verified 11/28/18 19:08











Home Medications: 


Ambulatory Orders





Famotidine [Pepcid -] 20 mg PO BID #14 tablet 11/28/18 


Pantoprazole Sodium [Protonix -] 40 mg PO DAILY #30 tablet.ec 11/28/18 


cloNIDine HCL [Catapres -] 0.1 mg PO TID PRN #9 tablet 11/30/18 











**Review of Systems





- Review of Systems


Constitutional: No: Chills, Diaphoresis, Fever


HEENTM: No: Eye Pain, Blurred Vision, Tearing


Respiratory: No: Cough, Orthopnea, Shortness of Breath


Cardiac (ROS): No: Chest Pain, Edema


ABD/GI: No: Diarrhea, Nausea, Poor Appetite


: No: Burning, Dysuria, Discharge


Musculoskeletal: No: Back Pain, Joint Pain


Integumentary: No: Erythema, Flushing, Lesions, Lumps


Neurological: No: Headache, Numbness, Paresthesia


Psychiatric: Yes: Anxiety, Other (substance abuse)


Endocrine: No: Flushing, Intolerance to Cold


Hematologic/Lymphatic: No: Anemia, Blood Clots, Easy Bleeding





<Marino Giraldo - Last Filed: 11/30/18 14:05>





*Physical Exam





- Vital Signs


 Last Vital Signs











Temp Pulse Resp BP Pulse Ox


 


 98.4 F   117 H  18   115/89   100 


 


 11/30/18 12:08  11/30/18 12:08  11/30/18 12:08  11/30/18 12:08  11/30/18 12:08














- Physical Exam


General Appearance: Yes: Nourished, Appropriately Dressed, Apparent Distress, 

Mild Distress (appears anxious)


HEENT: positive: EOMI, SARAH, Normal ENT Inspection, Normal Voice


Neck: positive: Trachea midline, Normal Thyroid, Supple.  negative: Tender, 

Rigid


Respiratory/Chest: positive: Lungs Clear, Normal Breath Sounds.  negative: 

Chest Tender, Respiratory Distress, Accessory Muscle Use


Cardiovascular: positive: Regular Rhythm, Tachycardia.  negative: Regular Rate


Gastrointestinal/Abdominal: positive: Normal Bowel Sounds, Flat, Soft.  negative

: Tender


Lymphatic: negative: Adenopathy, Tenderness


Musculoskeletal: positive: Normal Inspection.  negative: Decreased Range of 

Motion


Extremity: positive: Normal Capillary Refill, Normal Inspection, Normal Range 

of Motion.  negative: Tender


Integumentary: positive: Normal Color, Dry, Warm


Neurologic: positive: Fully Oriented, Alert, Normal Mood/Affect, Normal Response

, Motor Strength 5/5





<Marino Giraldo - Last Filed: 11/30/18 14:05>





- Vital Signs


 Last Vital Signs











Temp Pulse Resp BP Pulse Ox


 


 98.6 F   86   16   134/97   97 


 


 11/30/18 14:07  11/30/18 14:07  11/30/18 14:07  11/30/18 14:07  11/30/18 14:07














<Ingrid Da Silva - Last Filed: 11/30/18 14:16>





Moderate Sedation





- Procedure Monitoring


Vital Signs: 


Procedure Monitoring Vital Signs











Temperature  98.4 F   11/30/18 12:08


 


Pulse Rate  117 H  11/30/18 12:08


 


Respiratory Rate  18   11/30/18 12:08


 


Blood Pressure  115/89   11/30/18 12:08


 


O2 Sat by Pulse Oximetry (%)  100   11/30/18 12:08











<Marino Giraldo - Last Filed: 11/30/18 14:05>





- Procedure Monitoring


Vital Signs: 


Procedure Monitoring Vital Signs











Temperature  98.6 F   11/30/18 14:07


 


Pulse Rate  86   11/30/18 14:07


 


Respiratory Rate  16   11/30/18 14:07


 


Blood Pressure  134/97   11/30/18 14:07


 


O2 Sat by Pulse Oximetry (%)  97   11/30/18 14:07











<Ingrid Da Silva - Last Filed: 11/30/18 14:16>





Medical Decision Making





- Medical Decision Making


23 year old male without previous medical history currently detoxing from 

percocets preenting with right sided chest pain. He has been here two times in 

the past three days for the same issues with EKG, troponins, and CXR negative x 

2. EKG here rate 90, WV interval 124, QRS 90, QTc 408, normal axis, with minor 

repolarization abnormalities in V2- V4 but unchanged from both EKGs on 11/28-11/ 29. Patient will be DC'd with clonidine 0.1 TID PRN for three days to help with 

anxiety and withdrawal symptoms. 


11/30/18 14:06








<Marino Giraldo - Last Filed: 11/30/18 14:05>





*DC/Admit/Observation/Transfer





- Discharge Dispostion


Decision to Admit order: No





<Marino Giraldo - Last Filed: 11/30/18 14:05>





- Discharge Dispostion


Decision to Admit order: No





<Ingrid Da Silva - Last Filed: 11/30/18 14:16>


Diagnosis at time of Disposition: 


 Opioid withdrawal





Chest pain


Qualifiers:


 Chest pain type: unspecified Qualified Code(s): R07.9 - Chest pain, unspecified








- Discharge Dispostion


Disposition: HOME





- Prescriptions


Prescriptions: 


cloNIDine HCL [Catapres -] 0.1 mg PO TID PRN #9 tablet


 PRN Reason: Anxiety





- Referrals


Referrals: 


[facility] [place] [Provider Group]


[facility] [Provider Group]





- Patient Instructions


Printed Discharge Instructions:  DI for Opioid Addiction


Additional Instructions: 


Please use the clonidine for your withdrawal symptoms if needed over the next 

three days. Do not use more than three in any day and only take 1 pill at a 

time. Please return to our ED if you have any new or worsening symptoms. If you 

want any help with detoxification, please call the detox physicians on the 

sheet if you need assistance.

## 2018-12-01 NOTE — EKG
Test Reason : 

Blood Pressure : ***/*** mmHG

Vent. Rate : 090 BPM     Atrial Rate : 090 BPM

   P-R Int : 124 ms          QRS Dur : 090 ms

    QT Int : 334 ms       P-R-T Axes : 069 082 039 degrees

   QTc Int : 408 ms

 

NORMAL SINUS RHYTHM WITH SINUS ARRHYTHMIA

NORMAL ECG

WHEN COMPARED WITH ECG OF 28-NOV-2018 20:16,

NO SIGNIFICANT CHANGE WAS FOUND

Confirmed by MD JENNY, JACK ([id number]) on 12/1/2018 5:21:47 PM

 

Referred By:             Confirmed By:JACK ALVARADO MD

## 2018-12-03 ENCOUNTER — HOSPITAL ENCOUNTER (EMERGENCY)
Dept: HOSPITAL 74 - JERFT | Age: 23
Discharge: HOME | End: 2018-12-03
Payer: COMMERCIAL

## 2018-12-03 VITALS — DIASTOLIC BLOOD PRESSURE: 95 MMHG | HEART RATE: 82 BPM | SYSTOLIC BLOOD PRESSURE: 140 MMHG | TEMPERATURE: 98.3 F

## 2018-12-03 VITALS — BODY MASS INDEX: 33.2 KG/M2

## 2018-12-03 DIAGNOSIS — K21.9: ICD-10-CM

## 2018-12-03 DIAGNOSIS — F11.11: ICD-10-CM

## 2018-12-03 DIAGNOSIS — F17.210: ICD-10-CM

## 2018-12-03 DIAGNOSIS — F32.9: ICD-10-CM

## 2018-12-03 DIAGNOSIS — M79.602: Primary | ICD-10-CM

## 2018-12-03 NOTE — PDOC
History of Present Illness





- General


Chief Complaint: Pain


Stated Complaint: FOLLOW UP


Time Seen by Provider: 12/03/18 12:31





- History of Present Illness


Initial Comments: 





12/03/18 12:38


23-year-old male with a past medical history for GERD and anxiety recently off 

of opioids for the last 5 years presents for evaluation of atraumatic onset of 

left arm pain. His left arm pain has been present for the last day he wanted to 

make sure he was not in cardiac arrest as per his words





Past History





- Past Medical History


Allergies/Adverse Reactions: 


 Allergies











Allergy/AdvReac Type Severity Reaction Status Date / Time


 


No Known Allergies Allergy   Verified 12/03/18 12:09











Home Medications: 


Ambulatory Orders





Famotidine [Pepcid -] 20 mg PO BID #14 tablet 11/28/18 


Pantoprazole Sodium [Protonix -] 40 mg PO DAILY #30 tablet.ec 11/28/18 


cloNIDine HCL [Catapres -] 0.1 mg PO TID PRN #9 tablet 11/30/18 








COPD: No





- Immunization History


Immunization Up to Date: Yes





- Suicide/Smoking/Psychosocial Hx


Smoking Status: No


Smoking History: Current every day smoker


Have you smoked in the past 12 months: No


Number of Cigarettes Smoked Daily: 9


Information on smoking cessation initiated: No


Hx Alcohol Use: No


Drug/Substance Use Hx: Yes (percocets  3-4 per day long term)


Substance Use Type: None





**Review of Systems





- Review of Systems


Musculoskeletal: Yes: See HPI





*Physical Exam





- Vital Signs


 Last Vital Signs











Temp Pulse Resp BP Pulse Ox


 


 98.3 F   82   18   140/95   98 


 


 12/03/18 12:11  12/03/18 12:11  12/03/18 12:11  12/03/18 12:11  12/03/18 12:11














- Physical Exam


Comments: 





12/03/18 12:38


HEAD: NC/AT


EYES: Conjuntiva clear


Ears: Canals and TM's normal


NOSE: No d/c


THROAT: Moist mucous membrances, oral pharanx clear, uvula midline


NECK: Supple without adenopathy


CARDIAC: S1 S2


LUNGS: CTA Full and Equal breath sounds


ABDOMEN: Soft NT ND


MS: Full ROM in all joints without edema 


NEUROLOGIC: No gross sensory or motor deficits, NVID


SKIN: Normal color and temperature no lesions or rashes





Left arm skin color and temperature are normal range of motion is full and 

nonpainful. There is no areas of tenderness. 5 out of 5 strength with 

supraspinous isolation internal and external rotation no tenderness about the 

medial lateral upper condyle of the elbow full range of motion of the elbow 

wrist and forearm without pain. No gross sensorimotor deficits compartments are 

soft and nontender he is neurovascularly intact.





Moderate Sedation





- Procedure Monitoring


Vital Signs: 


Procedure Monitoring Vital Signs











Temperature  98.3 F   12/03/18 12:11


 


Pulse Rate  82   12/03/18 12:11


 


Respiratory Rate  18   12/03/18 12:11


 


Blood Pressure  140/95   12/03/18 12:11


 


O2 Sat by Pulse Oximetry (%)  98   12/03/18 12:11











*DC/Admit/Observation/Transfer


Diagnosis at time of Disposition: 


 Arm pain








- Discharge Dispostion


Disposition: HOME


Condition at time of disposition: Stable


Decision to Admit order: No





- Referrals


Referrals: 


Mazin Mckinley MD [Staff Physician] - 





- Patient Instructions


Additional Instructions: 


He may take Tylenol as directed for pain return to the emergency room should 

symptoms worsen or go unresolved and follow-up with orthopedic surgery in one 

to 2 days for further evaluation and treatment options should your pain return.





- Post Discharge Activity

## 2018-12-04 ENCOUNTER — HOSPITAL ENCOUNTER (EMERGENCY)
Dept: HOSPITAL 74 - JER | Age: 23
Discharge: HOME | End: 2018-12-04
Payer: COMMERCIAL

## 2018-12-04 VITALS — SYSTOLIC BLOOD PRESSURE: 154 MMHG | HEART RATE: 63 BPM | DIASTOLIC BLOOD PRESSURE: 91 MMHG | TEMPERATURE: 99.2 F

## 2018-12-04 VITALS — BODY MASS INDEX: 32.3 KG/M2

## 2018-12-04 DIAGNOSIS — F11.23: Primary | ICD-10-CM

## 2018-12-04 DIAGNOSIS — F41.9: ICD-10-CM

## 2018-12-04 LAB
APPEARANCE UR: CLEAR
BILIRUB UR STRIP.AUTO-MCNC: NEGATIVE MG/DL
COLOR UR: (no result)
KETONES UR QL STRIP: (no result)
LEUKOCYTE ESTERASE UR QL STRIP.AUTO: NEGATIVE
NITRITE UR QL STRIP: NEGATIVE
PH UR: 5 [PH] (ref 5–8)
PROT UR QL STRIP: NEGATIVE
PROT UR QL STRIP: NEGATIVE
SP GR UR: 1.02 (ref 1.01–1.03)
UROBILINOGEN UR STRIP-MCNC: NEGATIVE MG/DL (ref 0.2–1)

## 2018-12-04 NOTE — PDOC
Attending Attestation





- Resident


Resident Name: Andrea Rouse





- ED Attending Attestation


I have performed the following: I have examined & evaluated the patient, The 

case was reviewed & discussed with the resident, I agree w/resident's findings 

& plan, Exceptions are as noted





- HPI


HPI: 





12/04/18 18:33


24 yo male has a history of narcotic dependency and stopped taking percocet 1 

week ago .  He had been taking 3 tabs of percocet  daily for the past 3 years


12/04/18 18:48








- Physicial Exam


PE: 





12/04/18 18:49


wnwd 24 yo male presents with some anxiety since he stopped narcotics use 1 

week ago


head    ncat


eyes janie eomi


neck    supple


cvs    [id number]


lungs    cta b/l


abd    nontender


extremities    no deformities,no edema


skin    warm and dry 


neuro axox3,ambulatory,motor strength 5/5


psych slightly anxious








- Medical Decision Making





12/04/18 18:56


-he has had multiple ER visits within the past week.  Labs and ekgs done when 

he came to our ER on 11/27,11/28,/11/30 and today


-no fevers,no nausea or vomiting in past 2 days


-he came in to make sure everything was alright since he stopped his percocet 

abuse


12/04/18 19:52


sterile pyuria,follow up with urology

## 2018-12-04 NOTE — PDOC
History of Present Illness





- General


Chief Complaint: Shortness of Breath


Stated Complaint: Shortness of Breath


Time Seen by Provider: 12/04/18 17:50





- History of Present Illness


Initial Comments: 


The patient is a 23M w/ a history of percocet abuse. Last use last Tuesday (7d 

ago). Patient reports anxiety today and wanted to get checked out that he's 

managing himself right at home.


He denies pain, fevers/chills, chest pain, SOB, abdominal pain, N/V/C/D, or 

changes in sensation


12/04/18 18:41








Past History





- Past Medical History


Allergies/Adverse Reactions: 


 Allergies











Allergy/AdvReac Type Severity Reaction Status Date / Time


 


No Known Allergies Allergy   Verified 12/04/18 17:48











Home Medications: 


Ambulatory Orders





Famotidine [Pepcid -] 20 mg PO BID #14 tablet 11/28/18 


Pantoprazole Sodium [Protonix -] 40 mg PO DAILY #30 tablet.ec 11/28/18 


cloNIDine HCL [Catapres -] 0.1 mg PO TID PRN #9 tablet 11/30/18 








COPD: No





- Immunization History


Immunization Up to Date: Yes





- Suicide/Smoking/Psychosocial Hx


Smoking Status: No


Smoking History: Former smoker


Have you smoked in the past 12 months: Yes


Number of Cigarettes Smoked Daily: 9


If you are a former smoker, when did you quit?: 2 weeks ago


Information on smoking cessation initiated: No


Hx Alcohol Use: No


Drug/Substance Use Hx: Yes (percocets  3-4 per day long term)


Substance Use Type: None





**Review of Systems





- Review of Systems


Able to Perform ROS?: Yes


Comments:: 





GENERAL/CONSTITUTIONAL: No fever or chills. No weakness


HEAD, EYES, EARS, NOSE AND THROAT: No change in vision. No ear pain or 

discharge. No sore throat


CARDIOVASCULAR: No chest pain or shortness of breath


RESPIRATORY: Denies cough, hemoptysis


GASTROINTESTINAL: No nausea, vomiting, diarrhea or constipation


GENITOURINARY: No dysuria, frequency, or change in urination


MUSCULOSKELETAL: No joint or muscle swelling or pain. No neck or back pain


SKIN: No rash


NEUROLOGIC: No headache, vertigo, loss of consciousness, or change in strength/

sensation


ENDOCRINE: No increased thirst. No abnormal weight change


HEMATOLOGIC/LYMPHATIC: No anemia, easy bleeding, or history of blood clots


ALLERGIC/IMMUNOLOGIC: No hives or skin allergy


12/04/18 18:43








Is the patient limited English proficient: No





*Physical Exam





- Vital Signs


 Last Vital Signs











Temp Pulse Resp BP Pulse Ox


 


 99.2 F   63   18   154/91   100 


 


 12/04/18 17:49  12/04/18 17:49  12/04/18 17:49  12/04/18 17:49  12/04/18 17:49














- Physical Exam


Comments: 





GENERAL: Awake, alert, and fully oriented, in no acute distress


HEAD: No signs of trauma, normocephalic, atraumatic 


EYES: PERRLA, EOMI, sclera anicteric, conjunctiva clear


ENT: Hearing grossly normal, nares patent, oropharynx clear without exudates


LUNGS: No distress, speaks full sentences, clear to auscultation bilaterally


HEART: Regular rate and rhythm, normal S1 and S2, no murmurs appreciated, 

peripheral pulses normal and equal bilaterally


ABDOMEN: Soft, nontender, normoactive bowel sounds. No guarding, no rebound


EXTREMITIES : Normal inspection, Normal range of motion, no edema. No clubbing 

or cyanosis


NEUROLOGICAL: Cranial nerves II through XII grossly intact. Normal speech, 

normal gait, no focal sensorimotor deficits


SKIN: Warm, Dry, normal turgor, no rashes or lesions noted





12/04/18 18:43








Moderate Sedation





- Procedure Monitoring


Vital Signs: 


Procedure Monitoring Vital Signs











Temperature  99.2 F   12/04/18 17:49


 


Pulse Rate  63   12/04/18 17:49


 


Respiratory Rate  18   12/04/18 17:49


 


Blood Pressure  154/91   12/04/18 17:49


 


O2 Sat by Pulse Oximetry (%)  100   12/04/18 17:49











Medical Decision Making





- Medical Decision Making





The patient is a 23M w/ a history of percocet abuse who quite reportedly 

approximately 1 weeks ago


Previous UA w/ leukocytosis ()


Will repeat UA


12/04/18 18:50





UA w/o evidence of UTI


Plan for D/C w/ PCP f/u


Patient w/ clonidine from prevous Rx


-Counseled to take PRN if needed and to follow up with primary


Discharge instructions and return precautions given


Patient in agreement and verbalized agreement





Dispo: Home


12/04/18 19:52








*DC/Admit/Observation/Transfer


Diagnosis at time of Disposition: 


 Withdrawal complaint, Opioid withdrawal








- Discharge Dispostion


Disposition: HOME


Condition at time of disposition: Stable


Decision to Admit order: No





- Referrals


Referrals: 


[facility] [place] [facility] [place] [Provider Group]





- Patient Instructions


Printed Discharge Instructions:  DI for Opioid Addiction





- Post Discharge Activity


Forms/Work/School Notes:  Back to Work

## 2018-12-04 NOTE — PDOC
Rapid Medical Evaluation


Chief Complaint: Shortness of Breath


Time Seen by Provider: 12/04/18 17:50


Medical Evaluation: 


 Allergies











Allergy/AdvReac Type Severity Reaction Status Date / Time


 


No Known Allergies Allergy   Verified 12/04/18 17:48











12/04/18 17:51


I have performed a brief in-person evaluation of this patient.


The patient presents with a chief complaint of:opiod withdrawl,- percocets, 

none x 1 week.  after eating today has Some SOB, no cough/ fevers/ 


Pertinent physical exam findings: jittery/ mild hyperventilating. . 


I have ordered the following: nothing 


The patient will proceed to the ED for further evaluation.





**Discharge Disposition





- Diagnosis


 Withdrawal complaint








- Referrals





- Patient Instructions





- Post Discharge Activity

## 2018-12-13 ENCOUNTER — HOSPITAL ENCOUNTER (EMERGENCY)
Dept: HOSPITAL 74 - JER | Age: 23
Discharge: HOME | End: 2018-12-13
Payer: COMMERCIAL

## 2018-12-13 VITALS — SYSTOLIC BLOOD PRESSURE: 135 MMHG | DIASTOLIC BLOOD PRESSURE: 94 MMHG

## 2018-12-13 VITALS — HEART RATE: 88 BPM | TEMPERATURE: 98.2 F

## 2018-12-13 VITALS — BODY MASS INDEX: 33.3 KG/M2

## 2018-12-13 DIAGNOSIS — Z87.891: ICD-10-CM

## 2018-12-13 DIAGNOSIS — R07.9: Primary | ICD-10-CM

## 2018-12-13 DIAGNOSIS — F11.10: ICD-10-CM

## 2018-12-13 LAB
ANION GAP SERPL CALC-SCNC: 7 MMOL/L (ref 8–16)
BUN SERPL-MCNC: 19 MG/DL (ref 7–18)
CALCIUM SERPL-MCNC: 9.1 MG/DL (ref 8.5–10.1)
CHLORIDE SERPL-SCNC: 108 MMOL/L (ref 98–107)
CO2 SERPL-SCNC: 27 MMOL/L (ref 21–32)
CREAT SERPL-MCNC: 1.1 MG/DL (ref 0.55–1.3)
DEPRECATED RDW RBC AUTO: 12.1 % (ref 11.9–15.9)
GLUCOSE SERPL-MCNC: 120 MG/DL (ref 74–106)
HCT VFR BLD CALC: 44.5 % (ref 35.4–49)
HGB BLD-MCNC: 15.9 GM/DL (ref 11.7–16.9)
MCH RBC QN AUTO: 30.7 PG (ref 25.7–33.7)
MCHC RBC AUTO-ENTMCNC: 35.8 G/DL (ref 32–35.9)
MCV RBC: 85.8 FL (ref 80–96)
PLATELET # BLD AUTO: 174 K/MM3 (ref 134–434)
PMV BLD: 10.8 FL (ref 7.5–11.1)
POTASSIUM SERPLBLD-SCNC: 4.1 MMOL/L (ref 3.5–5.1)
RBC # BLD AUTO: 5.18 M/MM3 (ref 4–5.6)
SODIUM SERPL-SCNC: 142 MMOL/L (ref 136–145)
WBC # BLD AUTO: 6.6 K/MM3 (ref 4–10)

## 2018-12-13 NOTE — PDOC
Attending Attestation





- Resident


Resident Name: ViktoriyalavonAndrea linton





- ED Attending Attestation


I have performed the following: I have examined & evaluated the patient, The 

case was reviewed & discussed with the resident, I agree w/resident's findings 

& plan





- [facility]


HPI: 





12/13/18 14:52





The patient is a 23 year old male with a past medical history of percocet abuse 

(reportedly quit 16 days ago) here today for evaluation of chest pain. The 

patient reports that his chest pain is not positional, not reproducible, is 

diffuse, and started intermittently when he quit using percocet. The patient 

notes that his chest pain had been intermittent for the first 5 days since 

quitting and then went away for one week until today, but present x 1 week. He 

confirms palpitations. denies cough or congestion, no caffeine intake.


he admits to playing softball and being active/lifting.





Patient denies headache, lightheadedness. Denies fever, chills. Denies 

shortness of breath. Denies nausea, vomiting, diarrhea, abdominal pain. Denies 

lower extremity edema. Denies flu symptoms.





Allergies: NKA


Social history: Patient confirms occasional alcohol use and marijuana use (last 

use on 11/19). Patient reports quitting tobacco use two weeks ago.


PCP: none reported








12/13/18 14:56








- Physicial Exam


PE: 





12/13/18 14:53





NAD, well appearing, PERRL, EOMI, MMM, nl conjunctiva, anicteric; neck supple. 

lungs clear, RRR, abdomen soft nontender. CHARLES x4, no focal neuro deficits. No 

peripheral edema. normal color for ethnicity, WWP.








- Medical Decision Making





12/13/18 14:53





See HPI for details


Vital signs reviewed, wnl. 


Prior notes reviewed, including admissions, discharges and consultations. 

laboratory results and imaging reviewed, basic labs and lytes wnl


CXR_no acute pathology


Cardiac panel_neg trop


EKG normal sinus rhythm, no interval abnormalities, narrow QRS, ST and T wave 

segments and morphology normal. Nonspecific T wave abnormalities in inferior 

leads, some change from prior.


ED course: BP improved, comfortable, NAD. no chest pain, has h/o anxiety. 


off percocets/opioids. 


doubt cardiac/ACS given age, no other risk factors, and diffuse CP without 

focality. does admit to exercising and lifting and playing softball, so 

possible msk etiology.





Dispo: Pt to be discharged in stable condition. Patient and family made aware 

of impression and plan, return precautions discussed (including but not limited 

to worsening pain or symptoms), fevers, or signs of infection, chest pain, 

respiratory distress, inability to tolerate oral intake, dehydration, syncope, 

or neurologic changes). Follow up with PMD and/or specialist as recommended, 

follow up information provided, take medications as instructed for duration of 

time. continue with supportive care, avoid triggers and precipitants. All 

questions answered to patient's satisfaction and expressed understanding and 

comfort with this. 


Cardiology referrals given.

## 2018-12-13 NOTE — PDOC
History of Present Illness





- General


Chief Complaint: Chest Pain


Stated Complaint: REVISIT, SOB


Time Seen by Provider: 12/13/18 11:38





- History of Present Illness


Initial Comments: 





The patient is a 23M w/ a history of opioid abuse who presents for evaluation 

of sharp, non-radiating, R-sided chest pain since 0300 that has been constant 

since that time. Denies exertional pain. Denies anything that alleviates or 

exacerbates his pain. Has not tried taking anything for it.


Reports that pain is the same pain as the last two presentations to the ED that 

also brought him today.





Reports abstaining from Percocet/opioids for last 16 days


Quit tobacco 1m ago


Drinks EtOH socially but not daily, denies withdrawal symptoms.


12/13/18 11:59





Past History





- Past Medical History


Allergies/Adverse Reactions: 


 Allergies











Allergy/AdvReac Type Severity Reaction Status Date / Time


 


No Known Allergies Allergy   Verified 12/13/18 13:10











Home Medications: 


Ambulatory Orders





Famotidine [Pepcid -] 20 mg PO BID #14 tablet 11/28/18 








COPD: No





- Immunization History


Immunization Up to Date: Yes





- Suicide/Smoking/Psychosocial Hx


Smoking Status: No


Smoking History: Former smoker


Have you smoked in the past 12 months: No


Number of Cigarettes Smoked Daily: 9


If you are a former smoker, when did you quit?: 2 weeks ago


Information on smoking cessation initiated: No


Hx Alcohol Use: No


Drug/Substance Use Hx: No


Substance Use Type: None





**Review of Systems





- Review of Systems


Able to Perform ROS?: Yes


Comments:: 





GENERAL/CONSTITUTIONAL: No fever or chills. No weakness


HEAD, EYES, EARS, NOSE AND THROAT: No change in vision. No ear pain or 

discharge. No sore throat


CARDIOVASCULAR: No shortness of breath


RESPIRATORY: Denies cough, hemoptysis


GASTROINTESTINAL: No nausea, vomiting, diarrhea or constipation


GENITOURINARY: No dysuria, frequency, or change in urination


MUSCULOSKELETAL: No joint or muscle swelling or pain. No neck or back pain


SKIN: No rash


NEUROLOGIC: No headache, vertigo, loss of consciousness, or change in strength/

sensation


ENDOCRINE: No increased thirst. No abnormal weight change


HEMATOLOGIC/LYMPHATIC: No anemia, easy bleeding, or history of blood clots


ALLERGIC/IMMUNOLOGIC: No hives or skin allergy





12/13/18 11:57





Is the patient limited English proficient: No





*Physical Exam





- Vital Signs


 Last Vital Signs











Temp Pulse Resp BP Pulse Ox


 


 98.2 F   88   16   186/79 H  99 


 


 12/13/18 11:03  12/13/18 11:03  12/13/18 11:03  12/13/18 11:03  12/13/18 11:03














- Physical Exam


Comments: 





GENERAL: Awake, alert, and fully oriented, in no acute distress but appears 

anxious


HEAD: No signs of trauma, normocephalic, atraumatic


EYES: PERRLA, EOMI, sclera anicteric, conjunctiva clear


ENT: Hearing grossly normal, nares patent, oropharynx clear without exudates. 

Moist mucosa


LUNGS: No distress, speaks full sentences, clear to auscultation bilaterally


HEART: Regular rate and rhythm, normal S1 and S2, no murmurs appreciated, 

peripheral pulses normal and equal bilaterally


ABDOMEN: Soft, nontender, normoactive bowel sounds. No guarding, no rebound


EXTREMITIES : Normal inspection, Normal range of motion, no edema. No clubbing 

or cyanosis


NEUROLOGICAL: Cranial nerves II through XII grossly intact. Normal speech, 

normal gait, no focal sensorimotor deficits


SKIN: Warm, Dry, normal turgor, no rashes or lesions noted





12/13/18 11:57








Moderate Sedation





- Procedure Monitoring


Vital Signs: 


Procedure Monitoring Vital Signs











Temperature  98.2 F   12/13/18 11:03


 


Pulse Rate  88   12/13/18 11:03


 


Respiratory Rate  16   12/13/18 11:03


 


Blood Pressure  186/79 H  12/13/18 11:03


 


O2 Sat by Pulse Oximetry (%)  99   12/13/18 11:03











ED Treatment Course





- LABORATORY


CBC & Chemistry Diagram: 


 12/13/18 12:47





 12/13/18 12:47





Medical Decision Making





- Medical Decision Making


The patient is a 23M w/ a history of Percocet abuse





ED Course


ECG 


Tylenol 975mg PO once





EKG normal sinus rhythm, no interval abnormalities, narrow QRS, ST and T wave 

segments and


morphology normal. Nonspecific T wave abnormalities in inferior leads, some 

change from prior.


-Will obtain BMP, CBC, Trop I


12/13/18 11:57





Vital Signs











Temp Pulse Resp BP Pulse Ox


 


 98.2 F   88   16   135/94   99 


 


 12/13/18 11:03  12/13/18 11:03  12/13/18 11:03  12/13/18 12:37  12/13/18 11:03








Will obtain CXR to r/o PNA


12/13/18 12:40





Trop I neg


CXR w/o evidence of PNA


Patient's symptoms improved


Discharge instructions and return precautions given 


Plan for D/C w/ PCP anc cards f/u


Patient in agreement and verbalized understanding





Dispo: home


12/13/18 18:37











*DC/Admit/Observation/Transfer


Diagnosis at time of Disposition: 


Chest pain


Qualifiers:


 Chest pain type: unspecified Qualified Code(s): R07.9 - Chest pain, unspecified








- Discharge Dispostion


Disposition: HOME


Condition at time of disposition: Stable


Decision to Admit order: No





- Referrals


Referrals: 


[facility] [place] [Provider Group]


Edgardo Alcala MD [Staff Physician] - 


Alonzo Watts MD [Staff Physician] - 





- Patient Instructions


Printed Discharge Instructions:  DI for Atypical Chest Pain


Additional Instructions: 


You were seen in the Emergency Department for chest pain and anxiety. Please 

review the handout provided at discharge. Please follow up with the a primary 

care provider and Cardiology.


Return to the Emergency Department if you develop fevers/chills, worsening pain

, worsening symptoms, or any new/concerning symptoms.





- Post Discharge Activity

## 2018-12-14 NOTE — EKG
Test Reason : 

Blood Pressure : ***/*** mmHG

Vent. Rate : 085 BPM     Atrial Rate : 085 BPM

   P-R Int : 124 ms          QRS Dur : 094 ms

    QT Int : 354 ms       P-R-T Axes : 067 082 011 degrees

   QTc Int : 421 ms

 

NORMAL SINUS RHYTHM WITH SINUS ARRHYTHMIA

POSSIBLE LEFT ATRIAL ENLARGEMENT

INCOMPLETE RIGHT BUNDLE BRANCH BLOCK

BORDERLINE ECG

WHEN COMPARED WITH ECG OF 30-NOV-2018 12:07,

T WAVE INVERSION NOW EVIDENT IN INFERIOR LEADS

NONSPECIFIC T WAVE ABNORMALITY NOW EVIDENT IN LATERAL LEADS

Confirmed by NITA SORIANO, MIRI ([id number]) on 12/14/2018 10:19:21 AM

 

Referred By:             Confirmed By:MIRI MOYA MD

## 2023-02-16 ENCOUNTER — HOSPITAL ENCOUNTER (EMERGENCY)
Dept: HOSPITAL 74 - JER | Age: 28
Discharge: LEFT BEFORE DISCHARGE | End: 2023-02-16
Payer: SELF-PAY

## 2023-02-16 VITALS — BODY MASS INDEX: 31.6 KG/M2

## 2023-02-16 VITALS
RESPIRATION RATE: 18 BRPM | HEART RATE: 62 BPM | SYSTOLIC BLOOD PRESSURE: 118 MMHG | DIASTOLIC BLOOD PRESSURE: 72 MMHG | TEMPERATURE: 97.8 F

## 2023-02-16 DIAGNOSIS — L02.213: Primary | ICD-10-CM
